# Patient Record
Sex: FEMALE | Race: BLACK OR AFRICAN AMERICAN | NOT HISPANIC OR LATINO | URBAN - METROPOLITAN AREA
[De-identification: names, ages, dates, MRNs, and addresses within clinical notes are randomized per-mention and may not be internally consistent; named-entity substitution may affect disease eponyms.]

---

## 2017-04-28 ENCOUNTER — EMERGENCY (EMERGENCY)
Facility: HOSPITAL | Age: 22
LOS: 0 days | Discharge: HOME | End: 2017-04-29

## 2017-06-28 DIAGNOSIS — K04.7 PERIAPICAL ABSCESS WITHOUT SINUS: ICD-10-CM

## 2017-06-28 DIAGNOSIS — K08.89 OTHER SPECIFIED DISORDERS OF TEETH AND SUPPORTING STRUCTURES: ICD-10-CM

## 2020-01-25 ENCOUNTER — EMERGENCY (EMERGENCY)
Facility: HOSPITAL | Age: 25
LOS: 0 days | Discharge: HOME | End: 2020-01-25
Attending: EMERGENCY MEDICINE | Admitting: EMERGENCY MEDICINE
Payer: MEDICAID

## 2020-01-25 VITALS
OXYGEN SATURATION: 99 % | SYSTOLIC BLOOD PRESSURE: 116 MMHG | HEART RATE: 78 BPM | TEMPERATURE: 98 F | RESPIRATION RATE: 20 BRPM | DIASTOLIC BLOOD PRESSURE: 57 MMHG

## 2020-01-25 DIAGNOSIS — K03.81 CRACKED TOOTH: ICD-10-CM

## 2020-01-25 DIAGNOSIS — K02.9 DENTAL CARIES, UNSPECIFIED: ICD-10-CM

## 2020-01-25 DIAGNOSIS — K08.89 OTHER SPECIFIED DISORDERS OF TEETH AND SUPPORTING STRUCTURES: ICD-10-CM

## 2020-01-25 DIAGNOSIS — K04.7 PERIAPICAL ABSCESS WITHOUT SINUS: ICD-10-CM

## 2020-01-25 PROCEDURE — 41800 DRAINAGE OF GUM LESION: CPT

## 2020-01-25 PROCEDURE — 99284 EMERGENCY DEPT VISIT MOD MDM: CPT | Mod: 25

## 2020-01-25 RX ORDER — IBUPROFEN 200 MG
600 TABLET ORAL ONCE
Refills: 0 | Status: COMPLETED | OUTPATIENT
Start: 2020-01-25 | End: 2020-01-25

## 2020-01-25 RX ORDER — AMOXICILLIN 250 MG/5ML
1 SUSPENSION, RECONSTITUTED, ORAL (ML) ORAL
Qty: 30 | Refills: 0
Start: 2020-01-25 | End: 2020-02-03

## 2020-01-25 RX ADMIN — Medication 600 MILLIGRAM(S): at 13:12

## 2020-01-25 NOTE — ED ADULT NURSE NOTE - NSIMPLEMENTINTERV_GEN_ALL_ED
Implemented All Universal Safety Interventions:  [place] to call system. Call bell, personal items and telephone within reach. Instruct patient to call for assistance. Room bathroom lighting operational. Non-slip footwear when patient is off stretcher. Physically safe environment: no spills, clutter or unnecessary equipment. Stretcher in lowest position, wheels locked, appropriate side rails in place.

## 2020-01-25 NOTE — ED PROVIDER NOTE - PHYSICAL EXAMINATION
CONSTITUTIONAL: Well-appearing; well-nourished; in no apparent distress.   EYES: PERRL; EOM intact.   ENT: + teeth #13-15 are decayed/cracked with ttp. + swelling noted to L cheek.  normal nose; no rhinorrhea; normal pharynx with no tonsillar hypertrophy.   NECK: Supple; non-tender; no cervical lymphadenopathy.  CARDIOVASCULAR: Normal S1, S2; no murmurs, rubs, or gallops.   RESPIRATORY: Normal chest excursion with respiration; breath sounds clear and equal bilaterally; no wheezes, rhonchi, or rales.  MS: No evidence of trauma or deformity. Normal ROM in all four extremities; non-tender to palpation; distal pulses are normal.   SKIN: Normal for age and race; warm; dry; good turgor; no apparent lesions or exudate.   NEURO/PSYCH: A & O x 4; grossly unremarkable. mood and manner are appropriate.

## 2020-01-25 NOTE — CONSULT NOTE ADULT - SUBJECTIVE AND OBJECTIVE BOX
Patient is a 24y old  Female who presents with a chief complaint of pain and swelling on the upper left side.     HPI: Patient reports that the swelling started last night and that it has progressively gotten worse.      PAST MEDICAL & SURGICAL HISTORY:  No pertinent past medical history    (   ) heart valve replacement  (   ) joint replacement  (   ) pregnancy    MEDICATIONS  (STANDING):    MEDICATIONS  (PRN):      Allergies    No Known Allergies    Intolerances    FAMILY HISTORY    *SOCIAL HISTORY: (   ) Tobacco; (   ) ETOH    *Last Dental Visit: Many years ago (patient does not recall exactly when)    Vital Signs Last 24 Hrs  T(C): 36.9 (25 Jan 2020 11:10), Max: 36.9 (25 Jan 2020 11:10)  T(F): 98.5 (25 Jan 2020 11:10), Max: 98.5 (25 Jan 2020 11:10)  HR: 78 (25 Jan 2020 11:10) (78 - 78)  BP: 116/57 (25 Jan 2020 11:10) (116/57 - 116/57)  BP(mean): --  RR: 20 (25 Jan 2020 11:10) (20 - 20)  SpO2: 99% (25 Jan 2020 11:10) (99% - 99%)    LABS:    EOE:  TMJ (   ) clicks                     (   ) pops                     (   ) crepitus             Mandible <<FROM>>             Facial bones and MOM <<grossly intact>>             ( x  ) trismus             (   ) lymphadenopathy             ( x  ) swelling             ( x  ) asymmetry             (   ) palpation             (   ) dyspnea             (   ) dysphagia             (   ) loss of consciousness    IOE:  permanent dentition: with multiple broken and carious teeth          hard/soft palate:  (   ) palatal torus, <<No pathology noted>>           tongue/FOM <<No pathology noted>>           labial/buccal mucosa <<No pathology noted>>           (   ) percussion           ( x ) palpation           ( x  ) swelling            ( x ) abscess           (   ) sinus tract    Caries: broken root tips seen on #13, 14, and 15        *DENTAL RADIOGRAPHS:  None taken     RADIOLOGY & ADDITIONAL STUDIES: none    *ASSESSMENT: Acute abscess due to infected root tips #13, 14, and 15. Informed patient that she needs these teeth extracted because the abscess will only get worse.       *PLAN: Prescribe amoxicillin and perform an incision and drainage of the area to provide some relief for the patient. Informed the patient that the incision and drainage is a temporary solution and should help her get through the weekend. Patient understands that she needs to go see a dentist to have #13-15 extracted.    PROCEDURE: Incision and drainage of the upper left posterior area in the areas of #13-15.  Verbal and written consent given.  Anesthesia: 1 carpule 0.5% marcaine with 1:50,000 epinephrine and 1 carpule 4% septocaine with 1:100,000 epinephrine administered via buccal infiltration  Treatment: Note that patient was only able to open her mouth about 32mm at the time anesthesia was given. 2 cm-long incision made in the buccal vestibule in #13-14 area and blunt dissected to the bone. Drained about 3cc of blood; no pus was drained from the area. Irrigated using saline solution. Patient was able to open her mouth wider (approximately 40 mm opening). Post-op instructions given.    RECOMMENDATIONS:  1) Dental F/U with outpatient dentist for comprehensive dental care.   2) If any difficulty swallowing/breathing, fever occur, return to ER.   3) Recommend 500 mg amoxicillin every 8 hours for one week for infection and 600 mg ibuprofen every 6-8 hours as needed for pain    Keila Díaz DDS, [id number] (spectra number)

## 2020-01-25 NOTE — ED PROVIDER NOTE - ATTENDING CONTRIBUTION TO CARE
Pt presents for dental pain and facial swelling. On exam several missing and broken teeth on the left upper area and left facial swelling lateral face. Dental resident is doing incision and drainage.

## 2020-01-25 NOTE — ED PROVIDER NOTE - NS ED ROS FT
Constitutional: no fever, chills, no recent weight loss, change in appetite or malaise  Eyes: no redness/discharge/pain/vision changes  ENT: + L upper dental pain  Cardiac: No chest pain, SOB or edema.  Respiratory: No cough or respiratory distress  MS: no pain to back or extremities, no loss of ROM, no weakness  Neuro: No headache or weakness. No LOC.  Skin: No skin rash.  Except as documented in the HPI, all other systems are negative.

## 2020-01-25 NOTE — ED PROVIDER NOTE - PATIENT PORTAL LINK FT
You can access the FollowMyHealth Patient Portal offered by [facility] by registering at the following website: [email or web address]. By joining [employer]’s FollowMyHealth portal, you will also be able to view your health information using other applications (apps) compatible with our system.

## 2020-01-25 NOTE — ED PROVIDER NOTE - OBJECTIVE STATEMENT
24 year old F no pmhx c.o dental pain/swelling over L upper jaw x last few days. No fever/chills, nausea, vomiting, throat pain, hoarseness, voice changes, dysphagia, headache, ear pain.

## 2020-01-25 NOTE — ED PROVIDER NOTE - NSFOLLOWUPCLINICS_GEN_ALL_ED_FT
[facility]  Dental  [address] [place] [id number]  Phone: [phone]  Fax:   Follow Up Time:

## 2020-08-31 ENCOUNTER — EMERGENCY (EMERGENCY)
Facility: HOSPITAL | Age: 25
LOS: 0 days | Discharge: HOME | End: 2020-09-01
Attending: EMERGENCY MEDICINE
Payer: MEDICAID

## 2020-08-31 DIAGNOSIS — F41.9 ANXIETY DISORDER, UNSPECIFIED: ICD-10-CM

## 2020-08-31 DIAGNOSIS — N30.00 ACUTE CYSTITIS WITHOUT HEMATURIA: ICD-10-CM

## 2020-08-31 DIAGNOSIS — F16.10 HALLUCINOGEN ABUSE, UNCOMPLICATED: ICD-10-CM

## 2020-08-31 PROCEDURE — 99284 EMERGENCY DEPT VISIT MOD MDM: CPT

## 2020-09-01 VITALS
OXYGEN SATURATION: 99 % | WEIGHT: 125 LBS | HEIGHT: 59 IN | RESPIRATION RATE: 18 BRPM | TEMPERATURE: 98 F | DIASTOLIC BLOOD PRESSURE: 79 MMHG | HEART RATE: 95 BPM | SYSTOLIC BLOOD PRESSURE: 113 MMHG

## 2020-09-01 VITALS — SYSTOLIC BLOOD PRESSURE: 105 MMHG | HEART RATE: 80 BPM | DIASTOLIC BLOOD PRESSURE: 67 MMHG | TEMPERATURE: 97 F

## 2020-09-01 LAB
ALBUMIN SERPL ELPH-MCNC: 3.7 G/DL — SIGNIFICANT CHANGE UP (ref 3.3–5)
ALP SERPL-CCNC: 58 U/L — SIGNIFICANT CHANGE UP (ref 40–120)
ALT FLD-CCNC: 16 U/L — SIGNIFICANT CHANGE UP (ref 12–78)
AMPHET UR-MCNC: POSITIVE — SIGNIFICANT CHANGE UP
ANION GAP SERPL CALC-SCNC: 7 MMOL/L — SIGNIFICANT CHANGE UP (ref 5–17)
ANISOCYTOSIS BLD QL: SLIGHT — SIGNIFICANT CHANGE UP
APPEARANCE UR: CLEAR — SIGNIFICANT CHANGE UP
APTT BLD: 33.4 SEC — SIGNIFICANT CHANGE UP (ref 27.5–35.5)
AST SERPL-CCNC: 13 U/L — LOW (ref 15–37)
BACTERIA # UR AUTO: ABNORMAL
BARBITURATES UR SCN-MCNC: NEGATIVE — SIGNIFICANT CHANGE UP
BASOPHILS # BLD AUTO: 0.03 K/UL — SIGNIFICANT CHANGE UP (ref 0–0.2)
BASOPHILS NFR BLD AUTO: 0.4 % — SIGNIFICANT CHANGE UP (ref 0–2)
BENZODIAZ UR-MCNC: NEGATIVE — SIGNIFICANT CHANGE UP
BILIRUB SERPL-MCNC: 0.4 MG/DL — SIGNIFICANT CHANGE UP (ref 0.2–1.2)
BILIRUB UR-MCNC: NEGATIVE — SIGNIFICANT CHANGE UP
BUN SERPL-MCNC: 15 MG/DL — SIGNIFICANT CHANGE UP (ref 7–23)
CALCIUM SERPL-MCNC: 8.3 MG/DL — LOW (ref 8.5–10.1)
CHLORIDE SERPL-SCNC: 106 MMOL/L — SIGNIFICANT CHANGE UP (ref 96–108)
CK MB CFR SERPL CALC: <1 NG/ML — SIGNIFICANT CHANGE UP (ref 0.5–3.6)
CO2 SERPL-SCNC: 27 MMOL/L — SIGNIFICANT CHANGE UP (ref 22–31)
COCAINE METAB.OTHER UR-MCNC: NEGATIVE — SIGNIFICANT CHANGE UP
COLOR SPEC: YELLOW — SIGNIFICANT CHANGE UP
CREAT SERPL-MCNC: 0.61 MG/DL — SIGNIFICANT CHANGE UP (ref 0.5–1.3)
DACRYOCYTES BLD QL SMEAR: SLIGHT — SIGNIFICANT CHANGE UP
DIFF PNL FLD: NEGATIVE — SIGNIFICANT CHANGE UP
ELLIPTOCYTES BLD QL SMEAR: SLIGHT — SIGNIFICANT CHANGE UP
EOSINOPHIL # BLD AUTO: 0.06 K/UL — SIGNIFICANT CHANGE UP (ref 0–0.5)
EOSINOPHIL NFR BLD AUTO: 0.8 % — SIGNIFICANT CHANGE UP (ref 0–6)
EPI CELLS # UR: ABNORMAL
ETHANOL SERPL-MCNC: <10 MG/DL — SIGNIFICANT CHANGE UP (ref 0–10)
GLUCOSE SERPL-MCNC: 105 MG/DL — HIGH (ref 70–99)
GLUCOSE UR QL: 100 MG/DL
HCG SERPL-ACNC: <1 MIU/ML — SIGNIFICANT CHANGE UP
HCT VFR BLD CALC: 34.1 % — LOW (ref 34.5–45)
HGB BLD-MCNC: 11.3 G/DL — LOW (ref 11.5–15.5)
HYPOCHROMIA BLD QL: SLIGHT — SIGNIFICANT CHANGE UP
IMM GRANULOCYTES NFR BLD AUTO: 0.4 % — SIGNIFICANT CHANGE UP (ref 0–1.5)
INR BLD: 1.01 RATIO — SIGNIFICANT CHANGE UP (ref 0.88–1.16)
KETONES UR-MCNC: ABNORMAL
LEUKOCYTE ESTERASE UR-ACNC: ABNORMAL
LYMPHOCYTES # BLD AUTO: 1.89 K/UL — SIGNIFICANT CHANGE UP (ref 1–3.3)
LYMPHOCYTES # BLD AUTO: 26.7 % — SIGNIFICANT CHANGE UP (ref 13–44)
MANUAL SMEAR VERIFICATION: SIGNIFICANT CHANGE UP
MCHC RBC-ENTMCNC: 23.9 PG — LOW (ref 27–34)
MCHC RBC-ENTMCNC: 33.1 GM/DL — SIGNIFICANT CHANGE UP (ref 32–36)
MCV RBC AUTO: 72.1 FL — LOW (ref 80–100)
METHADONE UR-MCNC: NEGATIVE — SIGNIFICANT CHANGE UP
MICROCYTES BLD QL: SLIGHT — SIGNIFICANT CHANGE UP
MONOCYTES # BLD AUTO: 0.6 K/UL — SIGNIFICANT CHANGE UP (ref 0–0.9)
MONOCYTES NFR BLD AUTO: 8.5 % — SIGNIFICANT CHANGE UP (ref 2–14)
NEUTROPHILS # BLD AUTO: 4.48 K/UL — SIGNIFICANT CHANGE UP (ref 1.8–7.4)
NEUTROPHILS NFR BLD AUTO: 63.2 % — SIGNIFICANT CHANGE UP (ref 43–77)
NITRITE UR-MCNC: NEGATIVE — SIGNIFICANT CHANGE UP
NRBC # BLD: 0 /100 WBCS — SIGNIFICANT CHANGE UP (ref 0–0)
OPIATES UR-MCNC: NEGATIVE — SIGNIFICANT CHANGE UP
PCP SPEC-MCNC: SIGNIFICANT CHANGE UP
PCP UR-MCNC: NEGATIVE — SIGNIFICANT CHANGE UP
PH UR: 6 — SIGNIFICANT CHANGE UP (ref 5–8)
PLAT MORPH BLD: NORMAL — SIGNIFICANT CHANGE UP
PLATELET # BLD AUTO: 287 K/UL — SIGNIFICANT CHANGE UP (ref 150–400)
POIKILOCYTOSIS BLD QL AUTO: SLIGHT — SIGNIFICANT CHANGE UP
POTASSIUM SERPL-MCNC: 3.8 MMOL/L — SIGNIFICANT CHANGE UP (ref 3.5–5.3)
POTASSIUM SERPL-SCNC: 3.8 MMOL/L — SIGNIFICANT CHANGE UP (ref 3.5–5.3)
PROT SERPL-MCNC: 7.1 GM/DL — SIGNIFICANT CHANGE UP (ref 6–8.3)
PROT UR-MCNC: 15 MG/DL
PROTHROM AB SERPL-ACNC: 11.7 SEC — SIGNIFICANT CHANGE UP (ref 10.6–13.6)
RBC # BLD: 4.73 M/UL — SIGNIFICANT CHANGE UP (ref 3.8–5.2)
RBC # FLD: 14.9 % — HIGH (ref 10.3–14.5)
RBC BLD AUTO: ABNORMAL
RBC CASTS # UR COMP ASSIST: SIGNIFICANT CHANGE UP /HPF (ref 0–4)
SODIUM SERPL-SCNC: 140 MMOL/L — SIGNIFICANT CHANGE UP (ref 135–145)
SP GR SPEC: 1.02 — SIGNIFICANT CHANGE UP (ref 1.01–1.02)
THC UR QL: POSITIVE — SIGNIFICANT CHANGE UP
TROPONIN I SERPL-MCNC: <.015 NG/ML — SIGNIFICANT CHANGE UP (ref 0.01–0.04)
UROBILINOGEN FLD QL: 1 MG/DL
WBC # BLD: 7.09 K/UL — SIGNIFICANT CHANGE UP (ref 3.8–10.5)
WBC # FLD AUTO: 7.09 K/UL — SIGNIFICANT CHANGE UP (ref 3.8–10.5)
WBC UR QL: SIGNIFICANT CHANGE UP

## 2020-09-01 RX ORDER — SODIUM CHLORIDE 9 MG/ML
2000 INJECTION INTRAMUSCULAR; INTRAVENOUS; SUBCUTANEOUS ONCE
Refills: 0 | Status: COMPLETED | OUTPATIENT
Start: 2020-09-01 | End: 2020-09-01

## 2020-09-01 RX ORDER — CIPROFLOXACIN LACTATE 400MG/40ML
1 VIAL (ML) INTRAVENOUS
Qty: 6 | Refills: 0
Start: 2020-09-01 | End: 2020-09-03

## 2020-09-01 RX ADMIN — SODIUM CHLORIDE 2000 MILLILITER(S): 9 INJECTION INTRAMUSCULAR; INTRAVENOUS; SUBCUTANEOUS at 00:49

## 2020-09-01 RX ADMIN — SODIUM CHLORIDE 2000 MILLILITER(S): 9 INJECTION INTRAMUSCULAR; INTRAVENOUS; SUBCUTANEOUS at 03:05

## 2020-09-01 NOTE — ED PROVIDER NOTE - PATIENT PORTAL LINK FT
You can access the FollowMyHealth Patient Portal offered by [facility] by registering at the following website: [email or web address]. By joining [employer]’s FollowMyHealth portal, you will also be able to view your health information using other applications (apps) compatible with our system.

## 2020-09-01 NOTE — ED PROVIDER NOTE - CARE PLAN
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

## 2020-09-01 NOTE — ED ADULT TRIAGE NOTE - CHIEF COMPLAINT QUOTE
pt sabiha took ecstasy pill x 2 hours. pt is very anxious, states she is tired but afraid to fall asleep.

## 2020-09-01 NOTE — ED PROVIDER NOTE - PROGRESS NOTE DETAILS
Results reported to patient--grossly benign, labs consistent with stimulant/thc intox, uti noted on UA incidentally   Pt. reports feeling better after meds  pt. agrees to f/u with primary care outpt.  pt. understands to return to ED if symptoms worsen; will d/c with cipro for uti  counseled pt. on drug abuse

## 2020-09-01 NOTE — ED PROVIDER NOTE - PHYSICAL EXAMINATION
Vitals: WNL  Gen: AAOx3, NAD, sitting comfortably in stretcher, calm, non-toxic   Head: ncat, perrla, eomi b/l  Neck: supple, no lymphadenopathy, no midline deviation  Heart: rrr, no m/r/g  Lungs: CTA b/l, no rales/ronchi/wheezes  Abd: soft, nontender, non-distended, no rebound or guarding  Ext: no clubbing/cyanosis/edema  Neuro: sensation and muscle strength intact b/l, steady gait, CN2-12 intact b/l

## 2020-09-01 NOTE — ED ADULT NURSE NOTE - OBJECTIVE STATEMENT
Pt presents to ED stating that she took an ecstasy pill earlier tonight and she is now feeling "weird" which is not something she has felt before in her other two times of taking it. Also smoked marijuana tonight. Pt is calm and cooperative. states she is afraid to fall asleep. respirations even and unlabored. rsr on monitor, no ectopy. Pt denies physical complaints. Pt ambulated to and from restroom and provided urine sample without difficulty. vitals as noted. iv placed, labs drawn and sent, 18 ga r fa. ivf infusing well, site asymptomatic. will continue to monitor awaiting sobriety/dispo

## 2020-09-01 NOTE — ED PROVIDER NOTE - OBJECTIVE STATEMENT
26 yo F with anxiety after taking Ecstasy.  Pt. got it from a friend for the third time, but admits the original source is different this time.  She took 1 pill as directed, but felt very anxious after taking it.  She was concerned so she "came to the ER to get monitored."  No other complaints.   ROS: negative for fever, cough, headache, chest pain, shortness of breath, abd pain, nausea, vomiting, diarrhea, rash, paresthesia, and weakness--all other systems reviewed are negative.   PMH: negative; Meds: Denies; SH: Denies smoking/drinking, + recreational drug use

## 2020-09-01 NOTE — ED PROVIDER NOTE - CLINICAL SUMMARY MEDICAL DECISION MAKING FREE TEXT BOX
24 yo F with ecstasy intox, doubt co-ingestion  -basic labs, coags, etoh, trop, ckmb, ua, cx, drug screen, hcg, ekg, iv, ns hydration bolus  -f/u results, reeval

## 2020-09-01 NOTE — ED PROVIDER NOTE - NSDCPRINTRESULTS_ED_ALL_ED
[Well Developed] : well developed [Well Nourished] : well nourished [NAD] : in no acute distress [EOMI] : ~T the extraocular movements were normal and intact [Moist & Pink Mucous Membranes] : moist and pink mucous membranes [Normal Tone] : normal tone [Wheelchair Bound] : wheelchair bound [Well-Perfused] : well-perfused [Acanthosis Nigricans] : acanthosis nigricans [Respiratory Distress] : no respiratory distress  [icteric] : anicteric [Edema] : no edema [Verbal] : non verbal [Cyanosis] : no cyanosis [Rash] : no rash [FreeTextEntry5] : Unable to assess due to patient's irritability  [FreeTextEntry4] : Unable to assess due to patient's irritability [Jaundice] : no jaundice [de-identified] : Unable to assess due to patient's irritability  Patient requests all Lab and Radiology Results on their Discharge Instructions [de-identified] : Non-verbal, grunting and yelling, hitting head frequently with hands, aggressive behavior

## 2020-09-02 PROBLEM — Z78.9 OTHER SPECIFIED HEALTH STATUS: Chronic | Status: ACTIVE | Noted: 2020-01-25

## 2020-09-02 LAB
CULTURE RESULTS: SIGNIFICANT CHANGE UP
SPECIMEN SOURCE: SIGNIFICANT CHANGE UP

## 2021-09-13 NOTE — ED ADULT NURSE NOTE - CAS DISCH TRANSFER METHOD
Call your healthcare provider right away if you get any of the following signs or symptoms of bleeding/clotting problems:   * Pain, redness, swelling, or temperature changes to any part of your body   * Sudden weakness, numbness or tingling    * Chest pain or shortness in breath   * Sudden onset of slurred speech/inability to speak and/or facial droop or visual changes   * Headaches, dizziness, faint-feeling, or weakness   * Unusual bruising (unexplained or growing in size)   * Nosebleeds or bleeding gums   * Bleeding from cuts that take a long time to stop   * Menstrual bleeding or vaginal bleeding that is heavier than normal   * Pink or brown urine, red or black stools   * Coughing up blood   * Vomiting blood or material that looks like coffee grounds    Update Anticoagulation Clinic (Coumadin Clinic) with any of the following:   * Medication changes (new, stopped or dose changes, this includes over-the-counter medications and supplements)   * Planning on having any surgery, medical or dental procedures   * Diet changes   * Falls  (you should go to Emergency Department immediately for evaluation, then notify Anticoagulation Clinic)    Please, do not wait until your next appointment to update us on changes.      
Private car

## 2023-01-06 ENCOUNTER — EMERGENCY (EMERGENCY)
Facility: HOSPITAL | Age: 28
LOS: 0 days | Discharge: HOME | End: 2023-01-06
Attending: STUDENT IN AN ORGANIZED HEALTH CARE EDUCATION/TRAINING PROGRAM | Admitting: STUDENT IN AN ORGANIZED HEALTH CARE EDUCATION/TRAINING PROGRAM
Payer: MEDICAID

## 2023-01-06 ENCOUNTER — EMERGENCY (EMERGENCY)
Facility: HOSPITAL | Age: 28
LOS: 0 days | Discharge: HOME | End: 2023-01-06
Attending: EMERGENCY MEDICINE | Admitting: EMERGENCY MEDICINE
Payer: MEDICAID

## 2023-01-06 VITALS
WEIGHT: 138.01 LBS | HEART RATE: 63 BPM | HEIGHT: 59 IN | DIASTOLIC BLOOD PRESSURE: 56 MMHG | SYSTOLIC BLOOD PRESSURE: 101 MMHG | OXYGEN SATURATION: 99 % | RESPIRATION RATE: 20 BRPM | TEMPERATURE: 99 F

## 2023-01-06 VITALS
TEMPERATURE: 96 F | HEIGHT: 59 IN | RESPIRATION RATE: 18 BRPM | DIASTOLIC BLOOD PRESSURE: 64 MMHG | OXYGEN SATURATION: 98 % | SYSTOLIC BLOOD PRESSURE: 116 MMHG | WEIGHT: 130.07 LBS | HEART RATE: 72 BPM

## 2023-01-06 DIAGNOSIS — K02.9 DENTAL CARIES, UNSPECIFIED: ICD-10-CM

## 2023-01-06 DIAGNOSIS — K08.89 OTHER SPECIFIED DISORDERS OF TEETH AND SUPPORTING STRUCTURES: ICD-10-CM

## 2023-01-06 DIAGNOSIS — Z87.891 PERSONAL HISTORY OF NICOTINE DEPENDENCE: ICD-10-CM

## 2023-01-06 PROCEDURE — 64400 NJX AA&/STRD TRIGEMINAL NRV: CPT

## 2023-01-06 PROCEDURE — 99284 EMERGENCY DEPT VISIT MOD MDM: CPT | Mod: 25

## 2023-01-06 PROCEDURE — 99284 EMERGENCY DEPT VISIT MOD MDM: CPT

## 2023-01-06 RX ORDER — BUPIVACAINE HCL/PF 7.5 MG/ML
2.5 VIAL (ML) INJECTION ONCE
Refills: 0 | Status: COMPLETED | OUTPATIENT
Start: 2023-01-06 | End: 2023-01-06

## 2023-01-06 RX ORDER — AMOXICILLIN 250 MG/5ML
1 SUSPENSION, RECONSTITUTED, ORAL (ML) ORAL
Qty: 21 | Refills: 0
Start: 2023-01-06 | End: 2023-01-12

## 2023-01-06 RX ORDER — LIDOCAINE HCL 20 MG/ML
2.5 VIAL (ML) INJECTION ONCE
Refills: 0 | Status: COMPLETED | OUTPATIENT
Start: 2023-01-06 | End: 2023-01-06

## 2023-01-06 RX ADMIN — Medication 2.5 MILLILITER(S): at 03:24

## 2023-01-06 NOTE — CONSULT NOTE ADULT - SUBJECTIVE AND OBJECTIVE BOX
Patient is a 27y old  Female who presents with a chief complaint of  dental pain    HPI:   Patient had been in pain for past week    PAST MEDICAL & SURGICAL HISTORY:  No pertinent past medical history        ( -  ) heart valve replacement  ( - ) joint replacement  ( -  ) pregnancy    MEDICATIONS  (STANDING):    MEDICATIONS  (PRN):      Allergies    No Known Allergies    Intolerances        FAMILY HISTORY:      *Last Dental Visit: unknown    Vital Signs Last 24 Hrs  T(C): 37 (06 Jan 2023 11:51), Max: 37 (06 Jan 2023 11:51)  T(F): 98.6 (06 Jan 2023 11:51), Max: 98.6 (06 Jan 2023 11:51)  HR: 63 (06 Jan 2023 11:51) (63 - 72)  BP: 101/56 (06 Jan 2023 11:51) (101/56 - 116/64)  BP(mean): --  RR: 20 (06 Jan 2023 11:51) (18 - 20)  SpO2: 99% (06 Jan 2023 11:51) (98% - 99%)    Parameters below as of 06 Jan 2023 11:51  Patient On (Oxygen Delivery Method): room air        LABS:                  EOE:  TMJ (  - ) clicks                     ( -  ) pops                     (  - ) crepitus             Mandible <<FROM>>             Facial bones and MOM <<grossly intact>>             ( -  ) trismus             ( -  ) lymphadenopathy             ( -  ) swelling             ( -  ) asymmetry             (  - ) palpation             ( -  ) dyspnea             ( -  ) dysphagia             ( -  ) loss of consciousness  EOE WNL  IOE:  <<permanent >> dentition:  <<multiple missing teeth>>           hard/soft palate:  (  - ) palatal torus, <<No pathology noted>>           tongue/FOM <<No pathology noted>>           labial/buccal mucosa <<No pathology noted>>           ( +  ) percussion           ( + ) palpation           (  - ) swelling            (  - ) abscess           (  - ) sinus tract  #28,32 tender on percussion and palpation.  Dentition present: << y  >>  Mobility: <<  >>  Caries: << y  >>       *DENTAL RADIOGRAPHS: 2 Periapical #28,32 gross decay, #30 root tip    RADIOLOGY & ADDITIONAL STUDIES:    *ASSESSMENT: Patient is a 27y old  Female who presents with a chief complaint of  dental pain. Patient had been in pain for past week. EOE WNL#28,32 tender on percussion and palpation.,2 Periapical #28,32 gross decay, #30 root tip noted      *PLAN: #32 need to extracted with oral surgeon. Recommend treatement at primary dentist. Patient understands.    Amoxicillin 500 mg tablet, quantity 21 tablets, 7 day supply. Sig: Take one tablet by oral route every 8 hours for 7 days.   Ibuprofen 600 mg tablet, quantity 20, 5 day supply. Sig: take 1 tablet by oral route every 6 hours as needed for pain. Take with food.   RECOMMENDATIONS:  1) Complete medication as prescribed  2) Dental F/U with outpatient dentist for comprehensive dental care.   3) If any difficulty swallowing/breathing, fever occur, return to ER.     Hari Nunn DDS, Spectra #[phone]

## 2023-01-06 NOTE — ED PROVIDER NOTE - ATTENDING APP SHARED VISIT CONTRIBUTION OF CARE
27-year-old female with no past medical history who presents with dental pain.  Patient states that she has been having dental pain for the last month.  Patient denies any difficulty swallowing fevers chills nausea vomiting or any other medical complaints.    VITAL SIGNS: I have reviewed nursing notes and confirm.  CONSTITUTIONAL: non-toxic, well appearing  SKIN: no rash, no petechiae.  EYES:  EOMI, pink conjunctiva, anicteric  ENT: tongue midline, no exudates, MMM. + extensive dental caries right lower mandibular area; + tenderness diffusely on percussion; no abscess; no lymphadenopathy;  NECK: Supple; no meningismus, no JVD  CARD: RRR, no murmurs, equal radial pulses bilaterally 2+  RESP: CTAB, no respiratory distress  ABD: Soft, non-tender, non-distended, no peritoneal signs, no HSM, no CVA tenderness  EXT: Normal ROM x4. No edema. No calves tenderness  NEURO: Alert, oriented x 3

## 2023-01-06 NOTE — ED PROVIDER NOTE - PHYSICAL EXAMINATION
Decaying teeth throughout especially the right mandibular molars and premolars.  No signs of gingival fluctuance or submandibular or sublingual edema or swelling.  Airway intact.

## 2023-01-06 NOTE — ED PROVIDER NOTE - OBJECTIVE STATEMENT
27 yold female to ED with no signif med hx c/o right lower dental pain intermittently x 1 month - denies fever, chills,cough, neck back or abdominal pain

## 2023-01-06 NOTE — ED PROVIDER NOTE - NS ED ROS FT
Constitutional: (-) fever  Heent: see hpi  Cardiovascular: (-) syncope  Integumentary: (-) rash  Neurological: (-) altered mental status

## 2023-01-06 NOTE — ED PROVIDER NOTE - OBJECTIVE STATEMENT
27-year-old female here with dental pain for the last few weeks.  More on the right side.  No fever or swelling

## 2023-01-06 NOTE — ED PROVIDER NOTE - NSFOLLOWUPCLINICS_GEN_ALL_ED_FT
[facility]  Dental  [address] [place] [address]  Phone: [phone]  Fax:   Follow Up Time: 1-3 Days

## 2023-01-06 NOTE — ED PROVIDER NOTE - PHYSICAL EXAMINATION
Vital Signs: I have reviewed the initial vital signs.  Constitutional: well-nourished, appears stated age, in moderate distress due to dental pain;   Heent: + extensive dental caries right lower mandibular area; + tenderness diffusely on percussion; no abscess; no lymphadenopathy;

## 2023-01-06 NOTE — ED PROVIDER NOTE - CLINICAL SUMMARY MEDICAL DECISION MAKING FREE TEXT BOX
27-year-old female who presents with a couple of weeks of worsening diffuse dental pain more in the right side near #30.  Denies any swelling or fever.  On exam nontoxic, vital signs stable, no airway concerns or signs of deeper space infection clinically.  Feel stable for aou to dental clinic

## 2023-01-06 NOTE — ED ADULT TRIAGE NOTE - CHIEF COMPLAINT QUOTE
Patient complaining of R sided dental pain x1mo. Patient reports issues with both top and bottom teeth.

## 2023-01-06 NOTE — ED PROVIDER NOTE - PATIENT PORTAL LINK FT
You can access the FollowMyHealth Patient Portal offered by [facility] by registering at the following website: [email or web address]. By joining [employer]’s FollowMyHealth portal, you will also be able to view your health information using other applications (apps) compatible with our system.

## 2023-01-06 NOTE — ED PROVIDER NOTE - NSFOLLOWUPINSTRUCTIONS_ED_ALL_ED_FT
Addended by: ABBIE LANCASTER on: 6/23/2020 02:38 PM     Modules accepted: Orders    
Dental Pain    Dental pain (toothache) may be caused by many things including tooth decay (cavities or caries), abscess or infection, injury, or the reason may be unknown. Your pain may only occur when you are chewing, are exposed to hot or cold temperature, are eating or drinking sugary foods or beverages, or your pain may be constant. If you were prescribed an antibiotic medicine, finish all of it even if you start to feel better. Rinsing your mouth with salt water or applying ice to the painful area of your face may help with the pain.    SEEK IMMEDIATE MEDICAL CARE IF YOU HAVE THE FOLLOWING SYMPTOMS: unable to open mouth, trouble breathing or swallowing, fever, or swelling of the face, neck or jaw.

## 2023-01-06 NOTE — ED PROVIDER NOTE - CLINICAL SUMMARY MEDICAL DECISION MAKING FREE TEXT BOX
27 yr old f that presents with dental pain. Appropriate medications for patient's presenting complaints were ordered and effects were reassessed, pt given dental block and reports improvement.  Patient's records (prior hospital, ED visit, and/or nursing home notes if available) were reviewed.  Additional history was obtained from EMS, family, and/or PCP (where available).  Escalation to admission/observation was considered.  However patient feels much better and is comfortable with discharge.  Appropriate follow-up was arranged.  Pt was informed to come back for dental clinic at ~ 8 am. Pt given po antibiotics.     I have discussed the discharge plan with the patient. The patient agrees with the plan, as discussed.  The patient understands Emergency Department diagnosis is a preliminary diagnosis often based on limited information and that the patient must adhere to the follow-up plan as discussed.  The patient understands that if the symptoms worsen or if prescribed medications do not have the desired/planned effect that the patient may return to the Emergency Department at any time for further evaluation and treatment.

## 2023-01-06 NOTE — ED PROVIDER NOTE - NS ED ATTENDING STATEMENT MOD
This was a shared visit with the JL. I reviewed and verified the documentation and independently performed the documented:

## 2023-01-09 DIAGNOSIS — K08.89 OTHER SPECIFIED DISORDERS OF TEETH AND SUPPORTING STRUCTURES: ICD-10-CM

## 2023-01-09 DIAGNOSIS — Z87.891 PERSONAL HISTORY OF NICOTINE DEPENDENCE: ICD-10-CM

## 2023-01-09 DIAGNOSIS — K02.9 DENTAL CARIES, UNSPECIFIED: ICD-10-CM

## 2023-02-27 ENCOUNTER — OUTPATIENT (OUTPATIENT)
Dept: OUTPATIENT SERVICES | Facility: HOSPITAL | Age: 28
LOS: 1 days | End: 2023-02-27
Payer: COMMERCIAL

## 2023-02-27 ENCOUNTER — EMERGENCY (EMERGENCY)
Facility: HOSPITAL | Age: 28
LOS: 0 days | Discharge: HOME | End: 2023-02-27
Attending: EMERGENCY MEDICINE
Payer: MEDICAID

## 2023-02-27 VITALS
RESPIRATION RATE: 17 BRPM | OXYGEN SATURATION: 99 % | SYSTOLIC BLOOD PRESSURE: 98 MMHG | HEIGHT: 60 IN | TEMPERATURE: 98 F | WEIGHT: 130.07 LBS | HEART RATE: 73 BPM | DIASTOLIC BLOOD PRESSURE: 58 MMHG

## 2023-02-27 DIAGNOSIS — Z72.0 TOBACCO USE: ICD-10-CM

## 2023-02-27 DIAGNOSIS — K02.9 DENTAL CARIES, UNSPECIFIED: ICD-10-CM

## 2023-02-27 DIAGNOSIS — K08.89 OTHER SPECIFIED DISORDERS OF TEETH AND SUPPORTING STRUCTURES: ICD-10-CM

## 2023-02-27 PROCEDURE — D0220: CPT

## 2023-02-27 PROCEDURE — D0140: CPT

## 2023-02-27 PROCEDURE — 99284 EMERGENCY DEPT VISIT MOD MDM: CPT

## 2023-02-27 PROCEDURE — 99283 EMERGENCY DEPT VISIT LOW MDM: CPT

## 2023-02-27 NOTE — ED PROVIDER NOTE - PHYSICAL EXAMINATION
Patient no distress normal posterior pharynx visible decay to the right lower posterior molars with adjacent swelling to the face.  Neurologic grossly intact

## 2023-02-27 NOTE — ED ADULT NURSE NOTE - NSFALLRSKASSESSTYPE_ED_ALL_ED
Detail Level: Detailed Quality 110: Preventive Care And Screening: Influenza Immunization: Influenza Immunization not Administered due to Patient Allergy. Initial (On Arrival)

## 2023-02-27 NOTE — CONSULT NOTE ADULT - SUBJECTIVE AND OBJECTIVE BOX
Patient is a 28y old  Female who presents with a chief complaint of "My tooth hurts"     HPI: Patient presented from ED with persistent pain on #32. Patient came to [facility] through ED in 1/2023 for same issue and was told to schedule appt with OS for ext of #32.       PAST MEDICAL & SURGICAL HISTORY:  No pertinent past medical history        ( -  ) heart valve replacement  (  - ) joint replacement  (  - ) pregnancy    MEDICATIONS  (STANDING):    MEDICATIONS  (PRN):      Allergies    No Known Allergies    Intolerances          *SOCIAL HISTORY: ( +  ) Tobacco; ( -  ) ETOH    *Last Dental Visit: no history of dental visits    Vital Signs Last 24 Hrs  T(C): 36.6 (27 Feb 2023 11:30), Max: 36.6 (27 Feb 2023 11:30)  T(F): 97.9 (27 Feb 2023 11:30), Max: 97.9 (27 Feb 2023 11:30)  HR: 73 (27 Feb 2023 11:30) (73 - 73)  BP: 98/58 (27 Feb 2023 11:30) (98/58 - 98/58)  BP(mean): --  RR: 17 (27 Feb 2023 11:30) (17 - 17)  SpO2: 99% (27 Feb 2023 11:30) (99% - 99%)    Parameters below as of 27 Feb 2023 11:30  Patient On (Oxygen Delivery Method): room air      EOE:  TMJ (  - ) clicks                     ( -  ) pops                     ( -  ) crepitus             Mandible <<FROM>>             Facial bones and MOM <<grossly intact>>             ( -  ) trismus             ( -  ) lymphadenopathy             ( -  ) swelling             ( -  ) asymmetry             ( -  ) palpation             ( -  ) dyspnea             ( -  ) dysphagia             ( -  ) loss of consciousness    IOE:  permanent  dentition: multiple carious teeth; multiple missing teeth           hard/soft palate:  (   ) palatal torus, <<No pathology noted>>           tongue/FOM <<No pathology noted>>           labial/buccal mucosa <<No pathology noted>>           ( + ) percussion           (  + ) palpation           (   -) swelling            (  - ) abscess           (  - ) sinus tract    Dentition present: <<y   >>  Mobility: << n >>  Caries: <<y   >>         *DENTAL RADIOGRAPHS: 1 PA showing retained root #30 and coronal RL on #32    RADIOLOGY & ADDITIONAL STUDIES: na

## 2023-02-27 NOTE — ED PROVIDER NOTE - OBJECTIVE STATEMENT
Patient here with a few days of right lower dental pain.  No fever chills positive swelling to the right lower cheek area.

## 2023-02-27 NOTE — CONSULT NOTE ADULT - ASSESSMENT
*ASSESSMENT: 27 y/o pt presents from ED with persistent LRQ dental pain. Extraoral exam is WNL. Intraoral exam shows #32 with gross decay. Radiographic exam shows decay #28,32 retained root tip #30. Reiterated to pt that she needs #32 extracted with OS. OS appointment given to patient.       RECOMMENDATIONS:  1) OTC pain meds  2) Dental F/U with outpatient dentist for comprehensive dental care.   3) If any difficulty swallowing/breathing, fever occur, return to ER.     Lorenzo Stahl DMD, pager #[phone]

## 2023-05-17 DIAGNOSIS — K08.9 DISORDER OF TEETH AND SUPPORTING STRUCTURES, UNSPECIFIED: ICD-10-CM

## 2023-06-06 ENCOUNTER — OUTPATIENT (OUTPATIENT)
Dept: OUTPATIENT SERVICES | Facility: HOSPITAL | Age: 28
LOS: 1 days | End: 2023-06-06
Payer: COMMERCIAL

## 2023-06-06 DIAGNOSIS — K02.9 DENTAL CARIES, UNSPECIFIED: ICD-10-CM

## 2023-06-06 PROCEDURE — D7140: CPT

## 2023-06-06 PROCEDURE — D0230: CPT

## 2023-06-06 PROCEDURE — D0330: CPT

## 2023-06-08 DIAGNOSIS — K02.63 DENTAL CARIES ON SMOOTH SURFACE PENETRATING INTO PULP: ICD-10-CM

## 2023-06-14 ENCOUNTER — OUTPATIENT (OUTPATIENT)
Dept: OUTPATIENT SERVICES | Facility: HOSPITAL | Age: 28
LOS: 1 days | End: 2023-06-14
Payer: COMMERCIAL

## 2023-06-14 DIAGNOSIS — K02.63 DENTAL CARIES ON SMOOTH SURFACE PENETRATING INTO PULP: ICD-10-CM

## 2023-06-14 DIAGNOSIS — K02.9 DENTAL CARIES, UNSPECIFIED: ICD-10-CM

## 2023-06-14 PROCEDURE — D7140: CPT

## 2023-06-14 PROCEDURE — D0220: CPT

## 2023-06-14 PROCEDURE — D0140: CPT

## 2023-08-14 NOTE — ED PROVIDER NOTE - SECONDARY DIAGNOSIS.
Acute cystitis without hematuria PAST MEDICAL HISTORY:  Calculus of kidney     Club foot Born Right Foot    Diabetes Type 2 - does not take medications - monitors Blood Glucose at home - diet controlled    Hyperlipidemia     Hypertension     Myasthenia gravis     Urinary tract infection notes h/o UTI's